# Patient Record
Sex: FEMALE | Race: ASIAN | HISPANIC OR LATINO | URBAN - METROPOLITAN AREA
[De-identification: names, ages, dates, MRNs, and addresses within clinical notes are randomized per-mention and may not be internally consistent; named-entity substitution may affect disease eponyms.]

---

## 2023-03-22 DIAGNOSIS — I10 ESSENTIAL (PRIMARY) HYPERTENSION: ICD-10-CM

## 2023-03-24 RX ORDER — LISINOPRIL 40 MG/1
TABLET ORAL
Qty: 90 TABLET | Refills: 1 | Status: SHIPPED | OUTPATIENT
Start: 2023-03-24 | End: 2023-11-01 | Stop reason: SDUPTHER

## 2023-07-14 ENCOUNTER — TELEPHONE (OUTPATIENT)
Dept: PRIMARY CARE | Facility: CLINIC | Age: 63
End: 2023-07-14
Payer: COMMERCIAL

## 2023-07-14 NOTE — TELEPHONE ENCOUNTER
Patient is scheduled for a Physical in September and she would like to get her Mammogram done before then, would you be able to order a Mammogram for her.

## 2023-07-17 DIAGNOSIS — Z12.31 BREAST CANCER SCREENING BY MAMMOGRAM: Primary | ICD-10-CM

## 2023-09-05 ASSESSMENT — PROMIS GLOBAL HEALTH SCALE
RATE_AVERAGE_PAIN: 4
CARRYOUT_SOCIAL_ACTIVITIES: VERY GOOD
RATE_SOCIAL_SATISFACTION: VERY GOOD
RATE_PHYSICAL_HEALTH: GOOD
EMOTIONAL_PROBLEMS: SOMETIMES
RATE_QUALITY_OF_LIFE: GOOD
RATE_MENTAL_HEALTH: GOOD
RATE_GENERAL_HEALTH: GOOD
RATE_AVERAGE_FATIGUE: MILD
CARRYOUT_PHYSICAL_ACTIVITIES: COMPLETELY

## 2023-09-06 ENCOUNTER — OFFICE VISIT (OUTPATIENT)
Dept: PRIMARY CARE | Facility: CLINIC | Age: 63
End: 2023-09-06
Payer: COMMERCIAL

## 2023-09-06 VITALS
WEIGHT: 154 LBS | SYSTOLIC BLOOD PRESSURE: 126 MMHG | OXYGEN SATURATION: 98 % | HEIGHT: 60 IN | BODY MASS INDEX: 30.23 KG/M2 | HEART RATE: 74 BPM | DIASTOLIC BLOOD PRESSURE: 82 MMHG | RESPIRATION RATE: 16 BRPM | TEMPERATURE: 97.4 F

## 2023-09-06 DIAGNOSIS — Z12.11 SCREENING FOR MALIGNANT NEOPLASM OF COLON: ICD-10-CM

## 2023-09-06 DIAGNOSIS — I10 HYPERTENSION, UNSPECIFIED TYPE: ICD-10-CM

## 2023-09-06 DIAGNOSIS — Z00.00 HEALTHCARE MAINTENANCE: Primary | ICD-10-CM

## 2023-09-06 DIAGNOSIS — Z12.4 CERVICAL CANCER SCREENING: ICD-10-CM

## 2023-09-06 DIAGNOSIS — Z91.018 WHEAT ALLERGY: ICD-10-CM

## 2023-09-06 DIAGNOSIS — Z12.31 BREAST CANCER SCREENING BY MAMMOGRAM: ICD-10-CM

## 2023-09-06 PROBLEM — Z22.7 LATENT TUBERCULOSIS: Status: ACTIVE | Noted: 2023-09-06

## 2023-09-06 PROBLEM — R93.1 AGATSTON CAC SCORE, <100: Status: ACTIVE | Noted: 2023-09-06

## 2023-09-06 PROBLEM — M75.22 BICEPS TENDINITIS OF LEFT UPPER EXTREMITY: Status: ACTIVE | Noted: 2023-09-06

## 2023-09-06 PROBLEM — M25.60 JOINT STIFFNESS: Status: ACTIVE | Noted: 2023-09-06

## 2023-09-06 PROBLEM — M65.232: Status: ACTIVE | Noted: 2023-09-06

## 2023-09-06 PROBLEM — M75.22 BICEPS TENDINITIS OF LEFT UPPER EXTREMITY: Status: RESOLVED | Noted: 2023-09-06 | Resolved: 2023-09-06

## 2023-09-06 PROBLEM — M06.9 RHEUMATOID ARTHRITIS (MULTI): Status: ACTIVE | Noted: 2023-09-06

## 2023-09-06 PROBLEM — L13.0 DERMATITIS HERPETIFORMIS: Status: ACTIVE | Noted: 2023-09-06

## 2023-09-06 LAB
POC APPEARANCE, URINE: CLEAR
POC BILIRUBIN, URINE: NEGATIVE
POC BLOOD, URINE: NEGATIVE
POC COLOR, URINE: YELLOW
POC GLUCOSE, URINE: NEGATIVE MG/DL
POC KETONES, URINE: NEGATIVE MG/DL
POC LEUKOCYTES, URINE: NEGATIVE
POC NITRITE,URINE: NEGATIVE
POC PH, URINE: 7.5 PH
POC PROTEIN, URINE: NEGATIVE MG/DL
POC SPECIFIC GRAVITY, URINE: 1.01
POC UROBILINOGEN, URINE: 0.2 EU/DL

## 2023-09-06 PROCEDURE — 3079F DIAST BP 80-89 MM HG: CPT | Performed by: FAMILY MEDICINE

## 2023-09-06 PROCEDURE — 3074F SYST BP LT 130 MM HG: CPT | Performed by: FAMILY MEDICINE

## 2023-09-06 PROCEDURE — 81002 URINALYSIS NONAUTO W/O SCOPE: CPT | Performed by: FAMILY MEDICINE

## 2023-09-06 PROCEDURE — 1036F TOBACCO NON-USER: CPT | Performed by: FAMILY MEDICINE

## 2023-09-06 PROCEDURE — 99396 PREV VISIT EST AGE 40-64: CPT | Performed by: FAMILY MEDICINE

## 2023-09-06 PROCEDURE — 90750 HZV VACC RECOMBINANT IM: CPT | Performed by: FAMILY MEDICINE

## 2023-09-06 PROCEDURE — 90471 IMMUNIZATION ADMIN: CPT | Performed by: FAMILY MEDICINE

## 2023-09-06 PROCEDURE — 93000 ELECTROCARDIOGRAM COMPLETE: CPT | Performed by: FAMILY MEDICINE

## 2023-09-06 RX ORDER — DAPSONE 25 MG/1
25 TABLET ORAL DAILY
Qty: 30 TABLET | Refills: 0 | Status: SHIPPED | OUTPATIENT
Start: 2023-09-06 | End: 2023-10-02

## 2023-09-06 RX ORDER — DAPSONE 25 MG/1
1 TABLET ORAL DAILY
COMMUNITY
Start: 2022-11-30 | End: 2023-09-06 | Stop reason: SDUPTHER

## 2023-09-06 ASSESSMENT — PATIENT HEALTH QUESTIONNAIRE - PHQ9
SUM OF ALL RESPONSES TO PHQ9 QUESTIONS 1 AND 2: 1
2. FEELING DOWN, DEPRESSED OR HOPELESS: SEVERAL DAYS
1. LITTLE INTEREST OR PLEASURE IN DOING THINGS: NOT AT ALL

## 2023-09-06 NOTE — PATIENT INSTRUCTIONS
Today we performed your Annual Physical Exam.  Your preventative health care, labs and vaccinations have been reviewed and are up to date.      You received shingles #2 today    Follow up with Rheumatology for your RA now that you have completed your latent TB treatment.    Dapson refilled for your wheat allergy hives that you get intermittently    Follow up in 6 months for a medication check    Follow up in 1 year for your Complete Physical Exam

## 2023-09-06 NOTE — PROGRESS NOTES
Subjective   Patient ID: Amy Dawkins is a 63 y.o. female who presents for Annual Exam.    Dentist and Eye Doctor appointments: Yes  Immunizations: due for covid, shingles #2  Diet: was eating healthy before but not now due to stress  Exercise: biking occasionally  Supplements: Vit D  Menstrual Cycles:post menopause  Sexually Active:No  Pregnancy History:G0  Cancer Screening:    Cervical: 2021- Normal, no history of abnormal   Breast:8/2023 - wnl   Colon: 7/2018 - due in 10 years   Skin:wears sunscreen; sees derm yearly   DEXA:N/A          HPI     Review of Systems    Objective   /82   Pulse 74   Temp 36.3 °C (97.4 °F)   Resp 16   Ht 1.524 m (5')   Wt 69.9 kg (154 lb)   SpO2 98%   BMI 30.08 kg/m²     Physical Exam  Constitutional:       General: She is not in acute distress.     Appearance: She is well-developed. She is not diaphoretic.   HENT:      Head: Normocephalic and atraumatic.      Right Ear: Tympanic membrane normal.      Left Ear: Tympanic membrane normal.      Nose: Nose normal.      Mouth/Throat:      Mouth: Mucous membranes are moist.   Eyes:      General: No scleral icterus.     Pupils: Pupils are equal, round, and reactive to light.   Neck:      Thyroid: No thyromegaly.      Vascular: No JVD.   Cardiovascular:      Rate and Rhythm: Normal rate and regular rhythm.      Heart sounds: Normal heart sounds. No murmur heard.     No friction rub. No gallop.   Pulmonary:      Effort: Pulmonary effort is normal. No respiratory distress.      Breath sounds: Normal breath sounds. No wheezing or rales.   Chest:      Chest wall: No tenderness.   Breasts:     Right: Normal.      Left: Normal.   Abdominal:      General: Bowel sounds are normal. There is no distension.      Palpations: Abdomen is soft. There is no mass.      Tenderness: There is no abdominal tenderness. There is no rebound.   Musculoskeletal:         General: Normal range of motion.      Cervical back: Normal range of motion and neck  supple.   Lymphadenopathy:      Cervical: No cervical adenopathy.   Skin:     General: Skin is warm and dry.   Neurological:      General: No focal deficit present.      Mental Status: She is alert and oriented to person, place, and time.      Deep Tendon Reflexes: Reflexes normal.   Psychiatric:         Mood and Affect: Mood normal.         Behavior: Behavior normal.         Assessment/Plan   Diagnoses and all orders for this visit:  Healthcare maintenance  -     ECG 12 lead (Clinic Performed)  -     POCT UA (nonautomated) manually resulted  -     CBC; Future  -     Comprehensive Metabolic Panel; Future  -     Lipid Panel; Future  -     Vitamin D 25 hydroxy; Future  -     TSH with reflex to Free T4 if abnormal; Future  Hypertension, unspecified type  -     dapsone 25 mg tablet; Take 1 tablet (25 mg) by mouth once daily.  Wheat allergy  -     dapsone 25 mg tablet; Take 1 tablet (25 mg) by mouth once daily.  Cervical cancer screening  Screening for malignant neoplasm of colon  Comments:  2018 -[facility] - due in 10 years  Breast cancer screening by mammogram  Comments:  2023- wnl  Other orders  -     Zoster vaccine, recombinant, adult (SHINGRIX)

## 2023-09-27 PROBLEM — M65.4 DE QUERVAIN'S TENOSYNOVITIS: Status: ACTIVE | Noted: 2023-09-27

## 2023-09-27 PROBLEM — E66.3 OVERWEIGHT: Status: ACTIVE | Noted: 2023-09-27

## 2023-09-27 PROBLEM — F45.8 BRUXISM (TEETH GRINDING): Status: ACTIVE | Noted: 2023-09-27

## 2023-09-27 PROBLEM — E87.6 HYPOKALEMIA: Status: ACTIVE | Noted: 2023-09-27

## 2023-09-27 PROBLEM — E55.9 HYPOVITAMINOSIS D: Status: ACTIVE | Noted: 2023-09-27

## 2023-09-27 PROBLEM — R82.90 ABNORMAL URINALYSIS: Status: ACTIVE | Noted: 2023-09-27

## 2023-09-27 PROBLEM — K90.0 ADULT CELIAC DISEASE (HHS-HCC): Status: ACTIVE | Noted: 2023-09-27

## 2023-09-27 PROBLEM — G89.29 CHRONIC MIDLINE LOW BACK PAIN WITHOUT SCIATICA: Status: ACTIVE | Noted: 2017-06-15

## 2023-09-27 PROBLEM — I10 HYPERTENSION: Status: ACTIVE | Noted: 2023-09-27

## 2023-09-27 PROBLEM — M77.9 TENDINITIS: Status: ACTIVE | Noted: 2023-06-01

## 2023-09-27 PROBLEM — R89.9 ABNORMAL LABORATORY TEST RESULT: Status: ACTIVE | Noted: 2023-09-27

## 2023-09-27 PROBLEM — L81.9 ATYPICAL PIGMENTED SKIN LESION: Status: ACTIVE | Noted: 2023-09-27

## 2023-09-27 PROBLEM — M25.649 MORNING JOINT STIFFNESS OF HAND: Status: ACTIVE | Noted: 2023-09-27

## 2023-09-27 PROBLEM — M54.50 CHRONIC MIDLINE LOW BACK PAIN WITHOUT SCIATICA: Status: ACTIVE | Noted: 2017-06-15

## 2023-09-27 PROBLEM — H04.123 DRY EYES: Status: ACTIVE | Noted: 2023-09-27

## 2023-09-27 PROBLEM — G47.33 OBSTRUCTIVE SLEEP APNEA, ADULT: Status: ACTIVE | Noted: 2023-09-27

## 2023-09-27 PROBLEM — I49.9 CARDIAC ARRHYTHMIA, UNSPECIFIED: Status: ACTIVE | Noted: 2023-09-27

## 2023-09-27 PROBLEM — G47.09 OTHER INSOMNIA: Status: ACTIVE | Noted: 2023-09-27

## 2023-09-27 PROBLEM — M06.9: Status: ACTIVE | Noted: 2023-09-27

## 2023-09-27 PROBLEM — M25.50 ARTHRALGIA: Status: ACTIVE | Noted: 2023-09-27

## 2023-09-27 PROBLEM — H25.10 SENILE NUCLEAR SCLEROSIS: Status: ACTIVE | Noted: 2023-09-27

## 2023-09-27 PROBLEM — E06.1 DE QUERVAIN THYROIDITIS: Status: ACTIVE | Noted: 2023-09-27

## 2023-09-27 PROBLEM — M35.00 SJOGREN'S SYNDROME (MULTI): Status: ACTIVE | Noted: 2023-09-27

## 2023-09-27 PROBLEM — E78.5 HYPERLIPIDEMIA: Status: ACTIVE | Noted: 2023-09-27

## 2023-09-27 PROBLEM — R79.89 ABNORMAL CBC: Status: ACTIVE | Noted: 2023-09-27

## 2023-09-27 PROBLEM — Q07.9: Status: ACTIVE | Noted: 2023-09-27

## 2023-09-27 PROBLEM — R76.8 POSITIVE ANA (ANTINUCLEAR ANTIBODY): Status: ACTIVE | Noted: 2023-09-27

## 2023-09-27 RX ORDER — RIFAMPIN 300 MG/1
600 CAPSULE ORAL DAILY
COMMUNITY
End: 2024-01-19

## 2023-09-27 RX ORDER — METHYLPREDNISOLONE 4 MG/1
TABLET ORAL
COMMUNITY
End: 2024-05-01 | Stop reason: ALTCHOICE

## 2023-10-01 DIAGNOSIS — I10 HYPERTENSION, UNSPECIFIED TYPE: ICD-10-CM

## 2023-10-01 DIAGNOSIS — Z91.018 WHEAT ALLERGY: ICD-10-CM

## 2023-10-02 RX ORDER — DAPSONE 25 MG/1
25 TABLET ORAL DAILY
Qty: 90 TABLET | Refills: 1 | Status: SHIPPED | OUTPATIENT
Start: 2023-10-02

## 2023-10-03 ENCOUNTER — APPOINTMENT (OUTPATIENT)
Dept: RHEUMATOLOGY | Facility: CLINIC | Age: 63
End: 2023-10-03
Payer: COMMERCIAL

## 2023-10-05 ENCOUNTER — LAB (OUTPATIENT)
Dept: LAB | Facility: LAB | Age: 63
End: 2023-10-05
Payer: COMMERCIAL

## 2023-10-05 DIAGNOSIS — Z00.00 HEALTHCARE MAINTENANCE: ICD-10-CM

## 2023-10-05 LAB
25(OH)D3 SERPL-MCNC: 30 NG/ML (ref 30–100)
ALBUMIN SERPL BCP-MCNC: 4.1 G/DL (ref 3.4–5)
ALP SERPL-CCNC: 79 U/L (ref 33–136)
ALT SERPL W P-5'-P-CCNC: 15 U/L (ref 7–45)
ANION GAP SERPL CALC-SCNC: 11 MMOL/L (ref 10–20)
AST SERPL W P-5'-P-CCNC: 25 U/L (ref 9–39)
BILIRUB SERPL-MCNC: 0.7 MG/DL (ref 0–1.2)
BUN SERPL-MCNC: 11 MG/DL (ref 6–23)
CALCIUM SERPL-MCNC: 9.2 MG/DL (ref 8.6–10.3)
CHLORIDE SERPL-SCNC: 102 MMOL/L (ref 98–107)
CHOLEST SERPL-MCNC: 200 MG/DL (ref 0–199)
CHOLESTEROL/HDL RATIO: 2.9
CO2 SERPL-SCNC: 32 MMOL/L (ref 21–32)
CREAT SERPL-MCNC: 0.74 MG/DL (ref 0.5–1.05)
ERYTHROCYTE [DISTWIDTH] IN BLOOD BY AUTOMATED COUNT: 12.7 % (ref 11.5–14.5)
GFR SERPL CREATININE-BSD FRML MDRD: >90 ML/MIN/1.73M*2
GLUCOSE SERPL-MCNC: 83 MG/DL (ref 74–99)
HCT VFR BLD AUTO: 39.5 % (ref 36–46)
HDLC SERPL-MCNC: 68.3 MG/DL
HGB BLD-MCNC: 12.9 G/DL (ref 12–16)
LDLC SERPL CALC-MCNC: 117 MG/DL (ref 140–190)
MCH RBC QN AUTO: 30.3 PG (ref 26–34)
MCHC RBC AUTO-ENTMCNC: 32.7 G/DL (ref 32–36)
MCV RBC AUTO: 93 FL (ref 80–100)
NON HDL CHOLESTEROL: 132 MG/DL (ref 0–149)
NRBC BLD-RTO: 0 /100 WBCS (ref 0–0)
PLATELET # BLD AUTO: 202 X10*3/UL (ref 150–450)
PMV BLD AUTO: 10.1 FL (ref 7.5–11.5)
POTASSIUM SERPL-SCNC: 3.7 MMOL/L (ref 3.5–5.3)
PROT SERPL-MCNC: 7.2 G/DL (ref 6.4–8.2)
RBC # BLD AUTO: 4.26 X10*6/UL (ref 4–5.2)
SODIUM SERPL-SCNC: 141 MMOL/L (ref 136–145)
TRIGL SERPL-MCNC: 76 MG/DL (ref 0–149)
TSH SERPL-ACNC: 1.22 MIU/L (ref 0.44–3.98)
VLDL: 15 MG/DL (ref 0–40)
WBC # BLD AUTO: 4.8 X10*3/UL (ref 4.4–11.3)

## 2023-10-05 PROCEDURE — 82306 VITAMIN D 25 HYDROXY: CPT

## 2023-10-05 PROCEDURE — 36415 COLL VENOUS BLD VENIPUNCTURE: CPT

## 2023-10-05 PROCEDURE — 84443 ASSAY THYROID STIM HORMONE: CPT

## 2023-10-05 PROCEDURE — 80053 COMPREHEN METABOLIC PANEL: CPT

## 2023-10-05 PROCEDURE — 85027 COMPLETE CBC AUTOMATED: CPT

## 2023-10-05 PROCEDURE — 80061 LIPID PANEL: CPT

## 2023-10-06 ENCOUNTER — DOCUMENTATION (OUTPATIENT)
Dept: PRIMARY CARE | Facility: CLINIC | Age: 63
End: 2023-10-06
Payer: COMMERCIAL

## 2023-10-12 ENCOUNTER — OFFICE VISIT (OUTPATIENT)
Dept: ORTHOPEDIC SURGERY | Facility: CLINIC | Age: 63
End: 2023-10-12
Payer: COMMERCIAL

## 2023-10-12 DIAGNOSIS — M65.4 DE QUERVAIN'S TENOSYNOVITIS: Primary | ICD-10-CM

## 2023-10-12 PROCEDURE — 99213 OFFICE O/P EST LOW 20 MIN: CPT | Performed by: ORTHOPAEDIC SURGERY

## 2023-10-12 PROCEDURE — 1036F TOBACCO NON-USER: CPT | Performed by: ORTHOPAEDIC SURGERY

## 2023-10-12 NOTE — PROGRESS NOTES
History present illness: Patient presents today for ongoing evaluation of left wrist de Quervain's disease.  She is status post injection and therapy.  She is 75% improved.        Physical examination:  General: Alert and oriented to person, place, and time.  No acute distress and breathing comfortably: Pleasant and cooperative with examination.  Extremities: Evaluation of the left upper extremity finds the patient had palpable radial artery at the wrist with brisk capillary refill to all digits.  Patient has intact sensation to axillary radial median and ulnar nerves.  There are no open wounds.  There are no signs of infection.  There is no evidence of lymphedema or lymphatic streaking.  The patient has supple compartments to left arm forearm and hand.  Mild discomfort with Finkelstein's maneuver      Diagnostic studies:      Assessment: Left wrist de Quervain's disease responding well to nonoperative measures      Plan: Recommendations were made for continuance of home exercise program and follow-up with me on an as-needed basis.      Procedure:        Procedure:

## 2023-10-15 NOTE — PROGRESS NOTES
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

## 2023-10-16 ENCOUNTER — APPOINTMENT (OUTPATIENT)
Dept: OPHTHALMOLOGY | Facility: CLINIC | Age: 63
End: 2023-10-16
Payer: COMMERCIAL

## 2023-10-25 ENCOUNTER — TELEPHONE (OUTPATIENT)
Dept: SLEEP MEDICINE | Facility: HOSPITAL | Age: 63
End: 2023-10-25
Payer: COMMERCIAL

## 2023-10-25 NOTE — TELEPHONE ENCOUNTER
Called patient and left voice message for her to call and schedule her yearly follow-up appointment with Dr. De Anda or one of the Sleep Advance Practice Providers so she receive her new PAP supply prescription. Central Scheduling number [phone] and Sleep Department number [phone] given for patient to call back to schedule.

## 2023-11-01 DIAGNOSIS — I10 ESSENTIAL (PRIMARY) HYPERTENSION: ICD-10-CM

## 2023-11-02 RX ORDER — LISINOPRIL 40 MG/1
40 TABLET ORAL DAILY
Qty: 90 TABLET | Refills: 1 | Status: SHIPPED | OUTPATIENT
Start: 2023-11-02 | End: 2024-05-01 | Stop reason: ALTCHOICE

## 2024-01-11 ENCOUNTER — TELEPHONE (OUTPATIENT)
Dept: PRIMARY CARE | Facility: CLINIC | Age: 64
End: 2024-01-11
Payer: COMMERCIAL

## 2024-01-11 NOTE — TELEPHONE ENCOUNTER
Patient would like a referral for Dr.THOMAS NGUYEN optometrist in [place].  Please call patient as soon as referral is done.

## 2024-01-12 NOTE — TELEPHONE ENCOUNTER
"Spoke with patient. Previous eye doc was a \"specialist\" (an ophthalmologist) and when she went, there were so many \"steps and people\". Patient would like to see a regular doctor for just a routine eye exam. The doctor she is requesting is close and convenient for her.     Patient will check with her insurance as well.     Place referral?   "

## 2024-01-15 ENCOUNTER — APPOINTMENT (OUTPATIENT)
Dept: OPHTHALMOLOGY | Facility: CLINIC | Age: 64
End: 2024-01-15
Payer: COMMERCIAL

## 2024-01-19 ENCOUNTER — TELEPHONE (OUTPATIENT)
Dept: PRIMARY CARE | Facility: CLINIC | Age: 64
End: 2024-01-19
Payer: COMMERCIAL

## 2024-01-19 NOTE — TELEPHONE ENCOUNTER
----- Message from Nora Meehan DO sent at 1/18/2024 11:07 AM EST -----  Regarding: FW: Eye doctor referral  Contact: [phone]  Please update patient’s medication list to eliminate rifampin. Also, please let her know that she likely does not need a referral with her insurance. She can probably go see whatever eye doctor she would like.  ----- Message -----  From: Annamarie Santo MA  Sent: 1/18/2024   9:25 AM EST  To: Nora Meehan DO  Subject: FW: Eye doctor referral                            ----- Message -----  From: Amy Dawkins  Sent: 1/17/2024  10:15 PM EST  To: #  Subject: Eye doctor referral                              Bro Connors please tell Dr. Anthony that I no longer in Refampin medications.  I’m done with it last March, 2023.  The only prescription meds I regularly take is Lisinopril for my high blood pressure.  My other prescription Dapsone given by Dr. Anthony is only taken when I develop rashes due to gluten contamination.  Tomorrow,  I will be looking for an ophthalmologist if that’s what Dr. Anthony preferred for me to consult. Once I come up to some choice, I will call your clinic for another eye doctor referral. Thank you for your assistance.

## 2024-01-22 ENCOUNTER — OFFICE VISIT (OUTPATIENT)
Dept: SLEEP MEDICINE | Facility: CLINIC | Age: 64
End: 2024-01-22
Payer: COMMERCIAL

## 2024-01-22 VITALS
SYSTOLIC BLOOD PRESSURE: 151 MMHG | DIASTOLIC BLOOD PRESSURE: 94 MMHG | HEIGHT: 60 IN | HEART RATE: 71 BPM | BODY MASS INDEX: 30.8 KG/M2 | WEIGHT: 156.9 LBS | OXYGEN SATURATION: 98 %

## 2024-01-22 DIAGNOSIS — G47.33 OSA (OBSTRUCTIVE SLEEP APNEA): Primary | ICD-10-CM

## 2024-01-22 PROCEDURE — 99214 OFFICE O/P EST MOD 30 MIN: CPT | Performed by: INTERNAL MEDICINE

## 2024-01-22 PROCEDURE — 3080F DIAST BP >= 90 MM HG: CPT | Performed by: INTERNAL MEDICINE

## 2024-01-22 PROCEDURE — 1036F TOBACCO NON-USER: CPT | Performed by: INTERNAL MEDICINE

## 2024-01-22 PROCEDURE — 3077F SYST BP >= 140 MM HG: CPT | Performed by: INTERNAL MEDICINE

## 2024-01-22 ASSESSMENT — ENCOUNTER SYMPTOMS
DEPRESSION: 0
LOSS OF SENSATION IN FEET: 0
OCCASIONAL FEELINGS OF UNSTEADINESS: 0

## 2024-01-22 ASSESSMENT — PAIN SCALES - GENERAL: PAINLEVEL: 0-NO PAIN

## 2024-01-22 NOTE — PROGRESS NOTES
Patient: Amy Dawkins   Patient info: [id number]  : 1960 -- AGE 63 y.o.    Clinician: Raina De Anda MD   Location [facility]   Service Date: 2024          [facility]  Follow-up Visit Note      HISTORY OF PRESENT ILLNESS     Amy Dawkins is a 63 y.o. female who presents to a [facility] for followup.     The patient  has a past medical history of Biceps tendinitis of left upper extremity (2023), Calcific tendinitis of left forearm (2023), Dermatitis herpetiformis (2023), Hypertension, Joint stiffness (2023), Latent tuberculosis (2023), Rheumatoid arthritis (CMS/[facility]) (2023), and Uterine fibroid.    PAST SLEEP HISTORY  TONI, here for yearly visit.   CONCERNS: needs new supplies and needs this visit.     INTERIM DATA REVIEW:  Personally reviewed any raw data such as interpretation report, data sheet, hypnogram, and titration table if available and applicable  Notes and encounters in interim    TONI:  Using nightly, variable sleep times Retired now. Getting more sleep now.   Had machine since   given a Resmed 5-21-20  PAP Adherence DATA:  Personally reviewed     Daytime Symptoms  Not sleepy  ESS   LYNDA     +benefits from machine: better quality sleep, more energy.  Mask: pillows  No issues, no leaks.     Sleep-Wake Schedule:   Night meds prior to sleep: none  Sleep times: 2am, asleep by 2:15am and up by 11am weekdays  Weekends: 3am, asleep by 3:10am, and awake by 11:30am  No naps  Sleeps 8 hours  Side sleeping     REVIEW OF SYSTEMS   Review of Systems  BP high today, did not take her meds, no symptoms    ALLERGIES AND MEDICATIONS     Allergies   Allergen Reactions    Sulfamethoxazole-Trimethoprim Nausea Only    Sulfa (Sulfonamide Antibiotics) Nausea/vomiting    Wheat Dermatitis       MEDICATIONS:     Current Outpatient Medications:     lisinopril 40 mg tablet, Take 1  tablet (40 mg) by mouth once daily., Disp: 90 tablet, Rfl: 1    omega-3/dha/epa/fish oil (OMEGA-3 ORAL), , Disp: , Rfl:     dapsone 25 mg tablet, TAKE 1 TABLET BY MOUTH EVERY DAY, Disp: 90 tablet, Rfl: 1    glucosamine sulfate (GLUCOSAMINE ORAL), , Disp: , Rfl:     methylPREDNISolone (Medrol) 4 mg tablet, Take as directed, Disp: , Rfl:       HISTORIES   PAST MEDICAL HISTORY : She  has a past medical history of Biceps tendinitis of left upper extremity (09/06/2023), Calcific tendinitis of left forearm (09/06/2023), Dermatitis herpetiformis (09/06/2023), Hypertension, Joint stiffness (09/06/2023), Latent tuberculosis (09/06/2023), Rheumatoid arthritis (CMS/[facility]) (09/06/2023), and Uterine fibroid.  PAST SURGICAL HISTORY: She  has a past surgical history that includes Myomectomy; LASIK; and Implant.   FAMILY HISTORY: No changes since previous visit. Otherwise non-contributory as charted.     SOCIAL HISTORY  Patient:  reports that she has never smoked. She has never used smokeless tobacco. She reports that she does not currently use alcohol after a past usage of about 2.0 standard drinks of alcohol per week. She reports that she does not use drugs.     PHYSICAL EXAM     VITAL SIGNS: BP (!) 151/94 (BP Location: Left arm, Patient Position: Sitting) Comment: PT didnt take medication  Pulse 71   Ht 1.524 m (5')   Wt 71.2 kg (156 lb 14.4 oz)   SpO2 98%   BMI 30.64 kg/m²   PREVIOUS WEIGHTS:  Wt Readings from Last 3 Encounters:   01/22/24 71.2 kg (156 lb 14.4 oz)   09/06/23 69.9 kg (154 lb)   11/30/22 68 kg (150 lb)     EXAM:  General: Alert, attentive, in NAD  HEENT: Nares patent, oropharynx is Mallampati class 1-2 uvula: midline nonedematous   Dentition/Jaw: appropriate dentition;    Neck: no visible masses  Lungs: Clear no cough  Extremities: warm, well perfused, no visible edema, normal joints,   Skin: no visible rash on face from PAP  Neurologic: face symmetric   Psychiatric: Affect appropriate      OTHER RESULTS/DATA      Lab Review   CBC:   Lab Results   Component Value Date    WBC 4.8 10/05/2023    HGB 12.9 10/05/2023    HCT 39.5 10/05/2023    MCV 93 10/05/2023     10/05/2023    TSH:   Lab Results   Component Value Date    TSH 1.22 10/05/2023   ; BMP:   Lab Results   Component Value Date    GLUCOSE 83 10/05/2023    CALCIUM 9.2 10/05/2023     10/05/2023    K 3.7 10/05/2023    CO2 32 10/05/2023     10/05/2023    BUN 11 10/05/2023    CREATININE 0.74 10/05/2023      Cardiac Review:   last ECG:   Encounter Date: 09/06/23   ECG 12 lead (Clinic Performed)    Narrative    NSR, Rate 70bpm, No acute ST segment or T wave changes, No ectopy, Normal   axis        ASSESSMENT/PLAN   Ms. Dawkins is a 63 y.o. female  returning to the  Sleep Medicine Clinic for follow-up of TONI doing well, needs supplies today. No other issues. Download was personally reviewed and is appropriate- control of apnea with good usage noted.     Problem List and Plan/Orders  Problem List Items Addressed This Visit    None  Visit Diagnoses       TONI (obstructive sleep apnea)    -  Primary    Relevant Orders    Positive Airway Pressure (PAP) Therapy    Follow Up In Adult Sleep Medicine            CGI:  1-very much improved    Recommendations/Plan/Management:  Continue AutoPAP [id number] with EPR 3cmH20 --> order placed for supplies   Adjustments:  none  DME: [facility], continue supplies  Adequate sleep duration and appropriate timing during retirment  Monitor BP     Diagnostics:  continued yearly downloads  Education: timing of machine renewal etc.   Followup: yearly for TONI    Raina De Anda MD

## 2024-01-22 NOTE — PATIENT INSTRUCTIONS
"     NAME: Amy Dawkins   DATE:  1/22/2024     Your Sleep Physician Today: Raina De Anda MD  Your Primary Care Physician: Nora Meehan, DO      Thank you for coming to the Sleep Medicine Clinic today!  Below is a summary of your treatment plan, other important information, and our contact numbers:    TREATMENT PLAN     Keeping a schedule is great in [facility]  Glad you are getting more sleep  Will put an order for your yearly supplies- please let me know if you have not heard from them. (You can also call them to check if you don't receive supplies)  You are eligible for a new machine in May 2025 (but only if something is wrong with your current machine)     Follow-up Appointment:   One year.      IMPORTANT PHONE NUMBERS     Appointments (for Pediatric Sleep Clinic): [phone] - option 1  Appointments (for Adult Sleep Clinic): [phone] - option 2  Appointments (For Sleep Studies): [phone]) - option 3  [employer] ([employer]): [phone]        OUR ADULT SLEEP MEDICINE TEAM   Please do not hesitate to call the office or sleep nurse with any questions between appointments:    Adult Sleep Nurses (Anai Carbone, RN and Mel Arnett RN):  For clinical questions and refilling prescriptions: [phone]  Email sleep diaries and other documents at: [email or web address]    Adult Sleep Medicine :  Iris Morris (For Loan/Adilson/Jaelyn/[place]):   P: [phone]  F: [phone]         CONTACTING YOUR SLEEP MEDICINE DOCTOR- Getting in touch     Send a message directly to your provider through \"My Chart\", which is the email service through your  Records Account: [email or web address]   Call [phone] and leave a message. One of the administrative assistants will forward the message to your sleep medicine provider through \"My Chart\" and/or email.   Sleep nurse: For clinical questions and refilling " prescriptions: [phone]; or by email: [email or web address]    Please do not hesitate to reach out if you have pending questions.    Raina De Anda MD

## 2024-03-14 ENCOUNTER — OFFICE VISIT (OUTPATIENT)
Dept: OPHTHALMOLOGY | Facility: CLINIC | Age: 64
End: 2024-03-14
Payer: COMMERCIAL

## 2024-03-14 DIAGNOSIS — H25.13 NUCLEAR SENILE CATARACT OF BOTH EYES: ICD-10-CM

## 2024-03-14 DIAGNOSIS — H04.123 DRY EYES: ICD-10-CM

## 2024-03-14 DIAGNOSIS — Q07.9: ICD-10-CM

## 2024-03-14 DIAGNOSIS — H40.003 GLAUCOMA SUSPECT OF BOTH EYES: Primary | ICD-10-CM

## 2024-03-14 DIAGNOSIS — Z98.890 HISTORY OF LASER ASSISTED IN SITU KERATOMILEUSIS: ICD-10-CM

## 2024-03-14 LAB
AVERAGE RNFL TODAY (OD): 100
AVERAGE RNFL TODAY (OS): 99

## 2024-03-14 PROCEDURE — 99214 OFFICE O/P EST MOD 30 MIN: CPT | Performed by: OPHTHALMOLOGY

## 2024-03-14 PROCEDURE — 92133 CPTRZD OPH DX IMG PST SGM ON: CPT | Performed by: OPHTHALMOLOGY

## 2024-03-14 ASSESSMENT — REFRACTION_MANIFEST
OS_AXIS: 038
OS_CYLINDER: -0.25
OD_CYLINDER: -0.50
OD_AXIS: 099
OS_SPHERE: +0.25
OD_SPHERE: -0.50

## 2024-03-14 ASSESSMENT — VISUAL ACUITY
OD_SC: 20/25
OD_BAT_HIGH: 20/25-1
METHOD: SNELLEN - LINEAR
OS_BAT_HIGH: 20/40+2
OS_SC: 20/25-3

## 2024-03-14 ASSESSMENT — SLIT LAMP EXAM - LIDS
COMMENTS: NORMAL
COMMENTS: NORMAL

## 2024-03-14 ASSESSMENT — CONF VISUAL FIELD
OS_INFERIOR_TEMPORAL_RESTRICTION: 0
OD_INFERIOR_NASAL_RESTRICTION: 0
OS_NORMAL: 1
OD_INFERIOR_TEMPORAL_RESTRICTION: 0
OS_INFERIOR_NASAL_RESTRICTION: 0
OS_SUPERIOR_NASAL_RESTRICTION: 0
METHOD: COUNTING FINGERS
OD_SUPERIOR_NASAL_RESTRICTION: 0
OD_SUPERIOR_TEMPORAL_RESTRICTION: 0
OD_NORMAL: 1
OS_SUPERIOR_TEMPORAL_RESTRICTION: 0

## 2024-03-14 ASSESSMENT — CUP TO DISC RATIO
OD_RATIO: .5
OS_RATIO: .5

## 2024-03-14 ASSESSMENT — TONOMETRY
OD_IOP_MMHG: 13
IOP_METHOD: GOLDMANN APPLANATION
OS_IOP_MMHG: 13

## 2024-03-14 ASSESSMENT — EXTERNAL EXAM - RIGHT EYE: OD_EXAM: NORMAL

## 2024-03-14 ASSESSMENT — ENCOUNTER SYMPTOMS: EYES NEGATIVE: 1

## 2024-03-14 ASSESSMENT — EXTERNAL EXAM - LEFT EYE: OS_EXAM: NORMAL

## 2024-03-14 NOTE — PROGRESS NOTES
Assessment/Plan   Diagnoses and all orders for this visit:  Glaucoma suspect of both eyes  Congenital tilted optic nerve (CMS/HCC)  Stable RNFL OU  Monitor  -     OCT, Optic Nerve - OU - Both Eyes    Dry eyes  ATs    Nuclear senile cataract of both eyes  Not Visually significant   Monitor    History of laser assisted in situ keratomileusis  Monitor    1 year for OCT RNFL, DFE

## 2024-05-01 ENCOUNTER — OFFICE VISIT (OUTPATIENT)
Dept: PRIMARY CARE | Facility: CLINIC | Age: 64
End: 2024-05-01
Payer: COMMERCIAL

## 2024-05-01 VITALS
OXYGEN SATURATION: 97 % | TEMPERATURE: 97.6 F | RESPIRATION RATE: 20 BRPM | HEART RATE: 77 BPM | DIASTOLIC BLOOD PRESSURE: 102 MMHG | SYSTOLIC BLOOD PRESSURE: 162 MMHG | BODY MASS INDEX: 31.02 KG/M2 | WEIGHT: 158 LBS | HEIGHT: 60 IN

## 2024-05-01 DIAGNOSIS — R93.1 AGATSTON CAC SCORE, <100: ICD-10-CM

## 2024-05-01 DIAGNOSIS — E78.5 HYPERLIPIDEMIA, UNSPECIFIED HYPERLIPIDEMIA TYPE: ICD-10-CM

## 2024-05-01 DIAGNOSIS — I10 HYPERTENSION, UNSPECIFIED TYPE: Primary | ICD-10-CM

## 2024-05-01 PROBLEM — M77.9 TENDINITIS: Status: RESOLVED | Noted: 2023-06-01 | Resolved: 2024-05-01

## 2024-05-01 PROBLEM — M25.649 MORNING JOINT STIFFNESS OF HAND: Status: RESOLVED | Noted: 2023-09-27 | Resolved: 2024-05-01

## 2024-05-01 PROBLEM — G47.09 OTHER INSOMNIA: Status: RESOLVED | Noted: 2023-09-27 | Resolved: 2024-05-01

## 2024-05-01 PROBLEM — E66.3 OVERWEIGHT: Status: RESOLVED | Noted: 2023-09-27 | Resolved: 2024-05-01

## 2024-05-01 PROCEDURE — 3080F DIAST BP >= 90 MM HG: CPT | Performed by: FAMILY MEDICINE

## 2024-05-01 PROCEDURE — 99214 OFFICE O/P EST MOD 30 MIN: CPT | Performed by: FAMILY MEDICINE

## 2024-05-01 PROCEDURE — 3077F SYST BP >= 140 MM HG: CPT | Performed by: FAMILY MEDICINE

## 2024-05-01 PROCEDURE — 1036F TOBACCO NON-USER: CPT | Performed by: FAMILY MEDICINE

## 2024-05-01 RX ORDER — LISINOPRIL AND HYDROCHLOROTHIAZIDE 12.5; 2 MG/1; MG/1
2 TABLET ORAL DAILY
Qty: 60 TABLET | Refills: 3 | Status: SHIPPED | OUTPATIENT
Start: 2024-05-01 | End: 2024-05-22 | Stop reason: SDUPTHER

## 2024-05-01 RX ORDER — ACETAMINOPHEN 500 MG
1 TABLET ORAL DAILY
Qty: 1 KIT | Refills: 0 | Status: SHIPPED | OUTPATIENT
Start: 2024-05-01

## 2024-05-01 ASSESSMENT — ENCOUNTER SYMPTOMS: HYPERTENSION: 1

## 2024-05-01 NOTE — PATIENT INSTRUCTIONS
Today we have followed up on your blood pressure which is not well controlled  You also have not been following a heart healthy diet or doing regular exercise  Mediterranean and DASH diet information provided today  We will adjust your medication so that you are on Lisinopril 40mg with 25mg of hydrochlorothiazide    Follow up in 2 to 3 weeks and we will recheck at that time as well as check bMP to monitor your electrolytes.

## 2024-05-01 NOTE — PROGRESS NOTES
Subjective   Patient ID: Amy Dawkins is a 63 y.o. female who presents for Hypertension (Per patient she seen her sleep study physician in January and was told she should come in for a follow up. Patient was unsure if it was for her BP reading or about getting her Kidneys checked. ).    Current Outpatient Medications   Medication Instructions    blood pressure monitor (Blood Pressure Kit) kit 1 kit, miscellaneous, Daily    dapsone 25 mg, oral, Daily    lisinopril 40 mg, oral, Daily    omega-3/dha/epa/fish oil (OMEGA-3 ORAL) No dose, route, or frequency recorded.     Social History     Tobacco Use    Smoking status: Never    Smokeless tobacco: Never   Vaping Use    Vaping status: Never Used   Substance Use Topics    Alcohol use: Yes     Alcohol/week: 2.0 standard drinks of alcohol     Types: 2 Glasses of wine per week     Comment: rare    Drug use: Never       Hypertension  Patient is here for follow-up of elevated blood pressure. She is not exercising and is not adherent to a low-salt diet. Blood pressure unknown well controlled at home. Cardiac symptoms: none. Patient denies chest pain, dyspnea, near-syncope, and palpitations. Cardiovascular risk factors: dyslipidemia, hypertension, and obesity (BMI >= 30 kg/m2). Use of agents associated with hypertension: none. History of target organ damage: none.    Cardiac calcium score test was 0 in 2022    She didn't sleep with her CPAP on last night    Hypertension         Review of Systems    Objective   BP (!) 172/104 (BP Location: Right arm, Patient Position: Sitting)   Pulse 77   Temp 36.4 °C (97.6 °F) (Temporal)   Resp 20   Ht 1.524 m (5')   Wt 71.7 kg (158 lb)   SpO2 97%   BMI 30.86 kg/m²     Physical Exam  Constitutional:       Appearance: Normal appearance.   HENT:      Head: Normocephalic and atraumatic.      Mouth/Throat:      Mouth: Mucous membranes are moist.   Eyes:      Pupils: Pupils are equal, round, and reactive to light.   Cardiovascular:      Rate  and Rhythm: Normal rate and regular rhythm.      Pulses: Normal pulses.      Heart sounds: No murmur heard.  Pulmonary:      Effort: Pulmonary effort is normal.      Breath sounds: Normal breath sounds.   Musculoskeletal:         General: No swelling.      Cervical back: Normal range of motion and neck supple.   Neurological:      General: No focal deficit present.      Mental Status: She is alert and oriented to person, place, and time.         Assessment/Plan   Diagnoses and all orders for this visit:  Hypertension, unspecified type  -     blood pressure monitor (Blood Pressure Kit) kit; 1 kit once daily.  -     lisinopriL-hydrochlorothiazide 20-12.5 mg tablet; Take 2 tablets by mouth once daily.  Hyperlipidemia, unspecified hyperlipidemia type  Agatston CAC score, <100

## 2024-05-22 ENCOUNTER — TELEPHONE (OUTPATIENT)
Dept: PRIMARY CARE | Facility: CLINIC | Age: 64
End: 2024-05-22

## 2024-05-22 ENCOUNTER — LAB (OUTPATIENT)
Dept: LAB | Facility: LAB | Age: 64
End: 2024-05-22
Payer: COMMERCIAL

## 2024-05-22 ENCOUNTER — OFFICE VISIT (OUTPATIENT)
Dept: PRIMARY CARE | Facility: CLINIC | Age: 64
End: 2024-05-22
Payer: COMMERCIAL

## 2024-05-22 VITALS
OXYGEN SATURATION: 100 % | SYSTOLIC BLOOD PRESSURE: 128 MMHG | BODY MASS INDEX: 29.88 KG/M2 | DIASTOLIC BLOOD PRESSURE: 86 MMHG | TEMPERATURE: 98.2 F | WEIGHT: 153 LBS | HEART RATE: 72 BPM | RESPIRATION RATE: 16 BRPM

## 2024-05-22 DIAGNOSIS — E87.6 HYPOKALEMIA: Primary | ICD-10-CM

## 2024-05-22 DIAGNOSIS — I10 HYPERTENSION, UNSPECIFIED TYPE: Primary | ICD-10-CM

## 2024-05-22 DIAGNOSIS — I10 HYPERTENSION, UNSPECIFIED TYPE: ICD-10-CM

## 2024-05-22 LAB
ANION GAP SERPL CALC-SCNC: 9 MMOL/L (ref 10–20)
BUN SERPL-MCNC: 15 MG/DL (ref 6–23)
CALCIUM SERPL-MCNC: 9.4 MG/DL (ref 8.6–10.3)
CHLORIDE SERPL-SCNC: 104 MMOL/L (ref 98–107)
CO2 SERPL-SCNC: 31 MMOL/L (ref 21–32)
CREAT SERPL-MCNC: 0.79 MG/DL (ref 0.5–1.05)
EGFRCR SERPLBLD CKD-EPI 2021: 85 ML/MIN/1.73M*2
GLUCOSE SERPL-MCNC: 72 MG/DL (ref 74–99)
POTASSIUM SERPL-SCNC: 3.1 MMOL/L (ref 3.5–5.3)
SODIUM SERPL-SCNC: 141 MMOL/L (ref 136–145)

## 2024-05-22 PROCEDURE — 3074F SYST BP LT 130 MM HG: CPT | Performed by: FAMILY MEDICINE

## 2024-05-22 PROCEDURE — 99214 OFFICE O/P EST MOD 30 MIN: CPT | Performed by: FAMILY MEDICINE

## 2024-05-22 PROCEDURE — 3079F DIAST BP 80-89 MM HG: CPT | Performed by: FAMILY MEDICINE

## 2024-05-22 PROCEDURE — 80048 BASIC METABOLIC PNL TOTAL CA: CPT

## 2024-05-22 PROCEDURE — 36415 COLL VENOUS BLD VENIPUNCTURE: CPT

## 2024-05-22 RX ORDER — POTASSIUM CHLORIDE 750 MG/1
10 TABLET, FILM COATED, EXTENDED RELEASE ORAL DAILY
Qty: 4 TABLET | Refills: 0 | Status: SHIPPED | OUTPATIENT
Start: 2024-05-22 | End: 2024-05-26

## 2024-05-22 RX ORDER — LISINOPRIL AND HYDROCHLOROTHIAZIDE 12.5; 2 MG/1; MG/1
2 TABLET ORAL DAILY
Qty: 180 TABLET | Refills: 1 | Status: SHIPPED | OUTPATIENT
Start: 2024-05-22

## 2024-05-22 ASSESSMENT — ENCOUNTER SYMPTOMS: HYPERTENSION: 1

## 2024-05-22 NOTE — PROGRESS NOTES
Subjective   Patient ID: Amy Dawkins is a 63 y.o. female who presents for Hypertension.    Hypertension  Patient is here for follow-up of elevated blood pressure. She is exercising and is adherent to a low-salt diet. Blood pressure is well controlled at home. Cardiac symptoms: none. Patient denies chest pain. Cardiovascular risk factors: advanced age (older than 55 for men, 65 for women) and hypertension. Use of agents associated with hypertension: none. History of target organ damage: none.    She tries to refrain from red meat.  She thinks that she needs more water intake.  She doesn't like the greens.  She does not like to do low salt necessarily.  She is trying to use less just to have a taste.    We talked a lot about yoga and kashif chi.     Hypertension         Review of Systems    Objective   /86   Pulse 72   Temp 36.8 °C (98.2 °F)   Resp 16   Wt 69.4 kg (153 lb)   SpO2 100%   BMI 29.88 kg/m²     Physical Exam  Constitutional:       Appearance: Normal appearance.   HENT:      Head: Normocephalic and atraumatic.   Eyes:      Pupils: Pupils are equal, round, and reactive to light.   Cardiovascular:      Rate and Rhythm: Normal rate and regular rhythm.      Pulses: Normal pulses.      Heart sounds: No murmur heard.  Pulmonary:      Effort: Pulmonary effort is normal.      Breath sounds: Normal breath sounds.   Musculoskeletal:         General: No swelling.      Cervical back: Normal range of motion and neck supple.   Neurological:      Mental Status: She is alert.         Assessment/Plan   Diagnoses and all orders for this visit:  Hypertension, unspecified type  -     Basic metabolic panel; Future  -     lisinopriL-hydrochlorothiazide 20-12.5 mg tablet; Take 2 tablets by mouth once daily.

## 2024-05-22 NOTE — PATIENT INSTRUCTIONS
Today we addressed your high blood pressure. You are doing well. Continue your current medication.  I encourage you to continue a low salt diet, 5 servings of fruits and vegetables daily, and 30 minutes of exercise daily.  Follow up for regular medication checks at least every 6 months.

## 2024-09-10 ENCOUNTER — APPOINTMENT (OUTPATIENT)
Dept: PRIMARY CARE | Facility: CLINIC | Age: 64
End: 2024-09-10
Payer: COMMERCIAL

## 2024-09-10 ENCOUNTER — LAB (OUTPATIENT)
Dept: LAB | Facility: LAB | Age: 64
End: 2024-09-10
Payer: COMMERCIAL

## 2024-09-10 VITALS
HEIGHT: 60 IN | BODY MASS INDEX: 29.37 KG/M2 | SYSTOLIC BLOOD PRESSURE: 120 MMHG | OXYGEN SATURATION: 99 % | HEART RATE: 80 BPM | WEIGHT: 149.6 LBS | DIASTOLIC BLOOD PRESSURE: 78 MMHG | TEMPERATURE: 97.6 F | RESPIRATION RATE: 16 BRPM

## 2024-09-10 DIAGNOSIS — E87.6 HYPOKALEMIA: ICD-10-CM

## 2024-09-10 DIAGNOSIS — Z00.00 HEALTHCARE MAINTENANCE: Primary | ICD-10-CM

## 2024-09-10 DIAGNOSIS — Z00.00 HEALTHCARE MAINTENANCE: ICD-10-CM

## 2024-09-10 DIAGNOSIS — Z12.11 SCREENING FOR MALIGNANT NEOPLASM OF COLON: ICD-10-CM

## 2024-09-10 DIAGNOSIS — Z12.4 CERVICAL CANCER SCREENING: ICD-10-CM

## 2024-09-10 DIAGNOSIS — M06.9: ICD-10-CM

## 2024-09-10 DIAGNOSIS — I10 HYPERTENSION, UNSPECIFIED TYPE: ICD-10-CM

## 2024-09-10 DIAGNOSIS — Z12.31 BREAST CANCER SCREENING BY MAMMOGRAM: ICD-10-CM

## 2024-09-10 DIAGNOSIS — J45.990 EXERCISE-INDUCED ASTHMA (HHS-HCC): ICD-10-CM

## 2024-09-10 LAB
25(OH)D3 SERPL-MCNC: 33 NG/ML (ref 30–100)
ALBUMIN SERPL BCP-MCNC: 4.4 G/DL (ref 3.4–5)
ALP SERPL-CCNC: 78 U/L (ref 33–136)
ALT SERPL W P-5'-P-CCNC: 13 U/L (ref 7–45)
ANION GAP SERPL CALC-SCNC: 12 MMOL/L (ref 10–20)
AST SERPL W P-5'-P-CCNC: 22 U/L (ref 9–39)
BILIRUB SERPL-MCNC: 1 MG/DL (ref 0–1.2)
BUN SERPL-MCNC: 12 MG/DL (ref 6–23)
CALCIUM SERPL-MCNC: 9.8 MG/DL (ref 8.6–10.3)
CHLORIDE SERPL-SCNC: 98 MMOL/L (ref 98–107)
CHOLEST SERPL-MCNC: 209 MG/DL (ref 0–199)
CHOLESTEROL/HDL RATIO: 3.2
CO2 SERPL-SCNC: 34 MMOL/L (ref 21–32)
CREAT SERPL-MCNC: 0.76 MG/DL (ref 0.5–1.05)
EGFRCR SERPLBLD CKD-EPI 2021: 88 ML/MIN/1.73M*2
ERYTHROCYTE [DISTWIDTH] IN BLOOD BY AUTOMATED COUNT: 12.5 % (ref 11.5–14.5)
GLUCOSE SERPL-MCNC: 84 MG/DL (ref 74–99)
HCT VFR BLD AUTO: 41.9 % (ref 36–46)
HDLC SERPL-MCNC: 64.6 MG/DL
HGB BLD-MCNC: 13.7 G/DL (ref 12–16)
LDLC SERPL CALC-MCNC: 132 MG/DL
MCH RBC QN AUTO: 30.4 PG (ref 26–34)
MCHC RBC AUTO-ENTMCNC: 32.7 G/DL (ref 32–36)
MCV RBC AUTO: 93 FL (ref 80–100)
NON HDL CHOLESTEROL: 144 MG/DL (ref 0–149)
NRBC BLD-RTO: 0 /100 WBCS (ref 0–0)
PLATELET # BLD AUTO: 227 X10*3/UL (ref 150–450)
POC APPEARANCE, URINE: CLEAR
POC BILIRUBIN, URINE: NEGATIVE
POC BLOOD, URINE: NEGATIVE
POC COLOR, URINE: YELLOW
POC GLUCOSE, URINE: NEGATIVE MG/DL
POC KETONES, URINE: NEGATIVE MG/DL
POC LEUKOCYTES, URINE: ABNORMAL
POC NITRITE,URINE: NEGATIVE
POC PH, URINE: 7.5 PH
POC PROTEIN, URINE: NEGATIVE MG/DL
POC SPECIFIC GRAVITY, URINE: 1.01
POC UROBILINOGEN, URINE: 0.2 EU/DL
POTASSIUM SERPL-SCNC: 3.5 MMOL/L (ref 3.5–5.3)
PROT SERPL-MCNC: 7.5 G/DL (ref 6.4–8.2)
RBC # BLD AUTO: 4.51 X10*6/UL (ref 4–5.2)
SODIUM SERPL-SCNC: 140 MMOL/L (ref 136–145)
TRIGL SERPL-MCNC: 62 MG/DL (ref 0–149)
TSH SERPL-ACNC: 1.08 MIU/L (ref 0.44–3.98)
VLDL: 12 MG/DL (ref 0–40)
WBC # BLD AUTO: 4.9 X10*3/UL (ref 4.4–11.3)

## 2024-09-10 PROCEDURE — 1036F TOBACCO NON-USER: CPT | Performed by: FAMILY MEDICINE

## 2024-09-10 PROCEDURE — 80053 COMPREHEN METABOLIC PANEL: CPT

## 2024-09-10 PROCEDURE — 3074F SYST BP LT 130 MM HG: CPT | Performed by: FAMILY MEDICINE

## 2024-09-10 PROCEDURE — 93000 ELECTROCARDIOGRAM COMPLETE: CPT | Performed by: FAMILY MEDICINE

## 2024-09-10 PROCEDURE — 84443 ASSAY THYROID STIM HORMONE: CPT

## 2024-09-10 PROCEDURE — 85027 COMPLETE CBC AUTOMATED: CPT

## 2024-09-10 PROCEDURE — 36415 COLL VENOUS BLD VENIPUNCTURE: CPT

## 2024-09-10 PROCEDURE — 3008F BODY MASS INDEX DOCD: CPT | Performed by: FAMILY MEDICINE

## 2024-09-10 PROCEDURE — 80061 LIPID PANEL: CPT

## 2024-09-10 PROCEDURE — 82306 VITAMIN D 25 HYDROXY: CPT

## 2024-09-10 PROCEDURE — 81002 URINALYSIS NONAUTO W/O SCOPE: CPT | Performed by: FAMILY MEDICINE

## 2024-09-10 PROCEDURE — 99396 PREV VISIT EST AGE 40-64: CPT | Performed by: FAMILY MEDICINE

## 2024-09-10 PROCEDURE — 3078F DIAST BP <80 MM HG: CPT | Performed by: FAMILY MEDICINE

## 2024-09-10 RX ORDER — LISINOPRIL AND HYDROCHLOROTHIAZIDE 12.5; 2 MG/1; MG/1
2 TABLET ORAL DAILY
Qty: 180 TABLET | Refills: 1 | Status: SHIPPED | OUTPATIENT
Start: 2024-09-10

## 2024-09-10 ASSESSMENT — PROMIS GLOBAL HEALTH SCALE
RATE_QUALITY_OF_LIFE: GOOD
RATE_SOCIAL_SATISFACTION: GOOD
RATE_MENTAL_HEALTH: VERY GOOD
CARRYOUT_PHYSICAL_ACTIVITIES: COMPLETELY
RATE_PHYSICAL_HEALTH: GOOD
RATE_AVERAGE_FATIGUE: MODERATE
RATE_AVERAGE_PAIN: 2
CARRYOUT_SOCIAL_ACTIVITIES: VERY GOOD
EMOTIONAL_PROBLEMS: SOMETIMES
RATE_GENERAL_HEALTH: VERY GOOD

## 2024-09-10 ASSESSMENT — PATIENT HEALTH QUESTIONNAIRE - PHQ9
1. LITTLE INTEREST OR PLEASURE IN DOING THINGS: NOT AT ALL
SUM OF ALL RESPONSES TO PHQ9 QUESTIONS 1 AND 2: 0
2. FEELING DOWN, DEPRESSED OR HOPELESS: NOT AT ALL

## 2024-09-10 NOTE — PATIENT INSTRUCTIONS
Today we performed your Annual Physical Exam.  Your preventative health care, labs and vaccinations have been reviewed and are up to date.      Flu shot and covid vaccine recommended.     Today we addressed your high blood pressure. You are doing well. You did you mention occassional lightheadedness when you change positions quickly - I recommended that you continue to monitor this, drink plenty of water and for now Continue your current medication.  We may want to reduce it in the future.  I encourage you to continue a low salt diet, 5 servings of fruits and vegetables daily, and 30 minutes of exercise daily.  Follow up for regular medication checks at least every 6 months.

## 2024-09-10 NOTE — PROGRESS NOTES
Subjective   Patient ID: Amy Dawkins is a 64 y.o. female who presents for Annual Exam.    Dentist and Eye Doctor appointments: UTD  Immunizations: due for flu and covid booster  Diet: eats well balanced  Exercise: elliptical   Supplements: fish oil  Menstrual Cycles:post menopausal  Sexually Active: Not   Pregnancy History: G0  Cancer Screening:    Cervical: 2021 - wnl, negative hpv; always normal   Breast:due   Colon: 2018 - [facility]; due in 10 years   Skin: saw dermatology in 2023   DEXA:N/A          HPI     Review of Systems    Objective   /78 (BP Location: Left arm, Patient Position: Sitting)   Pulse 80   Temp 36.4 °C (97.6 °F)   Resp 16   Ht 1.524 m (5')   Wt 67.9 kg (149 lb 9.6 oz)   LMP  (LMP Unknown) Comment: menopause  SpO2 99%   BMI 29.22 kg/m²     Physical Exam  Constitutional:       General: She is not in acute distress.     Appearance: She is well-developed. She is not diaphoretic.   HENT:      Head: Normocephalic and atraumatic.      Right Ear: Tympanic membrane normal.      Left Ear: Tympanic membrane normal.      Nose: Nose normal.      Mouth/Throat:      Mouth: Mucous membranes are moist.   Eyes:      General: No scleral icterus.     Pupils: Pupils are equal, round, and reactive to light.   Neck:      Thyroid: No thyromegaly.      Vascular: No JVD.   Cardiovascular:      Rate and Rhythm: Normal rate and regular rhythm.      Heart sounds: Normal heart sounds. No murmur heard.     No friction rub. No gallop.   Pulmonary:      Effort: Pulmonary effort is normal. No respiratory distress.      Breath sounds: Normal breath sounds. No wheezing or rales.   Chest:      Chest wall: No tenderness.   Abdominal:      General: Bowel sounds are normal. There is no distension.      Palpations: Abdomen is soft. There is no mass.      Tenderness: There is no abdominal tenderness. There is no rebound.   Musculoskeletal:         General: Normal range of motion.      Cervical back: Normal range of motion and  neck supple.   Lymphadenopathy:      Cervical: No cervical adenopathy.   Skin:     General: Skin is warm and dry.   Neurological:      General: No focal deficit present.      Mental Status: She is alert and oriented to person, place, and time.      Deep Tendon Reflexes: Reflexes normal.   Psychiatric:         Mood and Affect: Mood normal.         Behavior: Behavior normal.         Assessment/Plan   Diagnoses and all orders for this visit:  Healthcare maintenance  -     ECG 12 Lead  -     CBC; Future  -     Comprehensive Metabolic Panel; Future  -     Lipid Panel; Future  -     TSH with reflex to Free T4 if abnormal; Future  -     Vitamin D 25 hydroxy; Future  Cervical cancer screening  Comments:  2021 - wnl, negative hpv; no history of abnormal  Breast cancer screening by mammogram  Comments:  due  Orders:  -     BI mammo bilateral screening tomosynthesis; Future  Screening for malignant neoplasm of colon  Comments:  2018 - due in 10 years

## 2024-09-28 ENCOUNTER — HOSPITAL ENCOUNTER (OUTPATIENT)
Dept: RADIOLOGY | Facility: HOSPITAL | Age: 64
Discharge: HOME | End: 2024-09-28
Payer: COMMERCIAL

## 2024-09-28 DIAGNOSIS — Z12.31 BREAST CANCER SCREENING BY MAMMOGRAM: ICD-10-CM

## 2024-09-28 PROCEDURE — 77067 SCR MAMMO BI INCL CAD: CPT | Performed by: RADIOLOGY

## 2024-09-28 PROCEDURE — 77063 BREAST TOMOSYNTHESIS BI: CPT | Performed by: RADIOLOGY

## 2024-09-28 PROCEDURE — 77067 SCR MAMMO BI INCL CAD: CPT

## 2025-03-17 ENCOUNTER — APPOINTMENT (OUTPATIENT)
Dept: OPHTHALMOLOGY | Facility: CLINIC | Age: 65
End: 2025-03-17
Payer: COMMERCIAL

## 2025-05-07 DIAGNOSIS — I10 HYPERTENSION, UNSPECIFIED TYPE: ICD-10-CM

## 2025-05-07 RX ORDER — LISINOPRIL AND HYDROCHLOROTHIAZIDE 12.5; 2 MG/1; MG/1
2 TABLET ORAL DAILY
Qty: 60 TABLET | Refills: 0 | Status: SHIPPED | OUTPATIENT
Start: 2025-05-07

## 2025-05-23 ENCOUNTER — APPOINTMENT (OUTPATIENT)
Facility: CLINIC | Age: 65
End: 2025-05-23
Payer: COMMERCIAL

## 2025-05-23 VITALS
DIASTOLIC BLOOD PRESSURE: 88 MMHG | OXYGEN SATURATION: 99 % | BODY MASS INDEX: 29.88 KG/M2 | WEIGHT: 153 LBS | HEART RATE: 64 BPM | RESPIRATION RATE: 16 BRPM | TEMPERATURE: 97.6 F | SYSTOLIC BLOOD PRESSURE: 138 MMHG

## 2025-05-23 DIAGNOSIS — G47.33 OBSTRUCTIVE SLEEP APNEA, ADULT: Primary | ICD-10-CM

## 2025-05-23 DIAGNOSIS — J45.990 EXERCISE INDUCED BRONCHOSPASM (HHS-HCC): ICD-10-CM

## 2025-05-23 DIAGNOSIS — Z15.89 MTHFR GENE MUTATION: ICD-10-CM

## 2025-05-23 DIAGNOSIS — M06.09 RHEUMATOID ARTHRITIS OF MULTIPLE SITES WITH NEGATIVE RHEUMATOID FACTOR (MULTI): ICD-10-CM

## 2025-05-23 DIAGNOSIS — I10 HYPERTENSION, UNSPECIFIED TYPE: ICD-10-CM

## 2025-05-23 DIAGNOSIS — Q07.9 CONGENITAL MALFORMATION OF NERVOUS SYSTEM, UNSPECIFIED: ICD-10-CM

## 2025-05-23 PROBLEM — M06.9: Status: RESOLVED | Noted: 2023-09-27 | Resolved: 2025-05-23

## 2025-05-23 PROBLEM — M25.60 JOINT STIFFNESS: Status: RESOLVED | Noted: 2023-09-06 | Resolved: 2025-05-23

## 2025-05-23 PROBLEM — R82.90 ABNORMAL URINALYSIS: Status: RESOLVED | Noted: 2023-09-27 | Resolved: 2025-05-23

## 2025-05-23 PROBLEM — R89.9 ABNORMAL LABORATORY TEST RESULT: Status: RESOLVED | Noted: 2023-09-27 | Resolved: 2025-05-23

## 2025-05-23 PROBLEM — M06.9 RHEUMATOID ARTHRITIS: Status: RESOLVED | Noted: 2023-09-06 | Resolved: 2025-05-23

## 2025-05-23 PROBLEM — H04.123 DRY EYES: Status: RESOLVED | Noted: 2023-09-27 | Resolved: 2025-05-23

## 2025-05-23 PROCEDURE — 3079F DIAST BP 80-89 MM HG: CPT | Performed by: FAMILY MEDICINE

## 2025-05-23 PROCEDURE — 99214 OFFICE O/P EST MOD 30 MIN: CPT | Performed by: FAMILY MEDICINE

## 2025-05-23 PROCEDURE — 3075F SYST BP GE 130 - 139MM HG: CPT | Performed by: FAMILY MEDICINE

## 2025-05-23 PROCEDURE — 1036F TOBACCO NON-USER: CPT | Performed by: FAMILY MEDICINE

## 2025-05-23 RX ORDER — LISINOPRIL AND HYDROCHLOROTHIAZIDE 12.5; 2 MG/1; MG/1
2 TABLET ORAL DAILY
Qty: 180 TABLET | Refills: 1 | Status: SHIPPED | OUTPATIENT
Start: 2025-05-23 | End: 2026-05-23

## 2025-05-23 ASSESSMENT — ENCOUNTER SYMPTOMS: HYPERTENSION: 1

## 2025-05-23 NOTE — PATIENT INSTRUCTIONS
Today we addressed your high blood pressure. You are doing well. Continue your current medication.  I encourage you to continue a low salt diet, 5 servings of fruits and vegetables daily, and 30 minutes of exercise daily.  Follow up for regular medication checks at least every 6 months.     Please make an appointment to follow up for your Annual Physical.

## 2025-05-23 NOTE — PROGRESS NOTES
Subjective   Patient ID: Amy Dawkins is a 64 y.o. female who presents for Hypertension.    BP around 133-135 over 80's.  No chest, sob/dizziness, syncope.  She exercises a little in the garden.      She has joint pain still and she was told she had RA but isn't treated    She also took a gene test recently and was told she has MTHFR     A spot was seen on her lungs and she was told she has latent TB.  This was in 2022.  She did 6 months of the rifampin    Hypertension        Review of Systems    Current Outpatient Medications   Medication Instructions    blood pressure monitor (Blood Pressure Kit) kit 1 kit, miscellaneous, Daily    dapsone 25 mg, oral, Daily    lisinopriL-hydrochlorothiazide 20-12.5 mg tablet 2 tablets, oral, Daily    omega-3/dha/epa/fish oil (OMEGA-3 ORAL) No dose, route, or frequency recorded.       RX Allergies[1]  Sulfamethoxazole-trimethoprim, Sulfa (sulfonamide antibiotics), and Wheat    Past Medical History:  09/27/2023: Abnormal CBC  09/27/2023: Abnormal laboratory test result  09/27/2023: Adult celiac disease ([facility])  09/27/2023: Arthralgia  09/27/2023: Atypical pigmented skin lesion  09/06/2023: Biceps tendinitis of left upper extremity  09/27/2023: Bruxism (teeth grinding)  09/06/2023: Calcific tendinitis of left forearm  06/15/2017: Chronic midline low back pain without sciatica  09/27/2023: De Quervain thyroiditis  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  09/27/2023: Positive REINA (antinuclear antibody)  09/06/2023: Rheumatoid arthritis  09/27/2023: Rheumatoid arthritis of distal  interphalangeal (DIP)   joint of finger (Multi)  09/27/2023: Sjogren's syndrome  No date: Uterine fibroid    Social History     Tobacco Use    Smoking status: Never    Smokeless tobacco: Never   Substance Use Topics    Alcohol use: Yes     Alcohol/week: 2.0 standard drinks of alcohol     Types: 2 Glasses of wine per week     Comment: rare       Objective     Vitals:    05/23/25 0742   BP: 138/88   Pulse: 64   Resp: 16   Temp: 36.4 °C (97.6 °F)   SpO2: 99%   Weight: 69.4 kg (153 lb)     No LMP recorded (lmp unknown). Patient is postmenopausal.    Physical Exam  Constitutional:       Appearance: Normal appearance.   HENT:      Head: Normocephalic and atraumatic.   Eyes:      Pupils: Pupils are equal, round, and reactive to light.   Cardiovascular:      Rate and Rhythm: Normal rate and regular rhythm.      Pulses: Normal pulses.      Heart sounds: No murmur heard.  Pulmonary:      Effort: Pulmonary effort is normal.      Breath sounds: Normal breath sounds.   Musculoskeletal:         General: No swelling.      Cervical back: Normal range of motion and neck supple.   Neurological:      Mental Status: She is alert.         Assessment/Plan   Diagnoses and all orders for this visit:  Obstructive sleep apnea, adult  Hypertension, unspecified type  -     lisinopriL-hydrochlorothiazide 20-12.5 mg tablet; Take 2 tablets by mouth once daily.  -     Basic Metabolic Panel; Future  MTHFR gene mutation                 [1]   Allergies  Allergen Reactions    Sulfamethoxazole-Trimethoprim Nausea Only    Sulfa (Sulfonamide Antibiotics) Nausea/vomiting    Wheat Dermatitis

## 2025-05-24 LAB
ANION GAP SERPL CALCULATED.4IONS-SCNC: 10 MMOL/L (CALC) (ref 7–17)
BUN SERPL-MCNC: 17 MG/DL (ref 7–25)
BUN/CREAT SERPL: ABNORMAL (CALC) (ref 6–22)
CALCIUM SERPL-MCNC: 9.6 MG/DL (ref 8.6–10.4)
CHLORIDE SERPL-SCNC: 101 MMOL/L (ref 98–110)
CO2 SERPL-SCNC: 31 MMOL/L (ref 20–32)
CREAT SERPL-MCNC: 0.7 MG/DL (ref 0.5–1.05)
EGFRCR SERPLBLD CKD-EPI 2021: 97 ML/MIN/1.73M2
GLUCOSE SERPL-MCNC: 96 MG/DL (ref 65–99)
POTASSIUM SERPL-SCNC: 3.2 MMOL/L (ref 3.5–5.3)
SODIUM SERPL-SCNC: 142 MMOL/L (ref 135–146)

## 2025-05-27 DIAGNOSIS — E87.6 HYPOKALEMIA: Primary | ICD-10-CM

## 2025-05-27 RX ORDER — POTASSIUM CHLORIDE 750 MG/1
10 TABLET, FILM COATED, EXTENDED RELEASE ORAL DAILY
Qty: 4 TABLET | Refills: 0 | Status: SHIPPED | OUTPATIENT
Start: 2025-05-27 | End: 2025-05-31

## 2025-09-12 ENCOUNTER — APPOINTMENT (OUTPATIENT)
Dept: PRIMARY CARE | Facility: CLINIC | Age: 65
End: 2025-09-12
Payer: COMMERCIAL